# Patient Record
(demographics unavailable — no encounter records)

---

## 2024-11-19 NOTE — REASON FOR VISIT
[Hyperlipidemia] : hyperlipidemia [Hypertension] : hypertension [Other: ____] : [unfilled] [FreeTextEntry1] : 76 years old male with small cell cancer of the lungs, status post chemotherapy, hypertension SVT, diabetes mellitus, dyslipidemia, and renal failure comes to the office for routine visit.  Patient denies any chest pain no shortness of breath no palpitation

## 2024-11-19 NOTE — ASSESSMENT
[FreeTextEntry1] : EKG done revealed regular sinus rhythm right bundle branch block.  Patient will continue present medication no changes are made

## 2024-11-19 NOTE — PHYSICAL EXAM
[Well Developed] : well developed [Well Nourished] : well nourished [No Acute Distress] : no acute distress [Normal Conjunctiva] : normal conjunctiva [No Carotid Bruit] : no carotid bruit [Normal Venous Pressure] : normal venous pressure [No Murmur] : no murmur [Normal S1, S2] : normal S1, S2 [No Rub] : no rub [No Gallop] : no gallop [Clear Lung Fields] : clear lung fields [Good Air Entry] : good air entry [No Respiratory Distress] : no respiratory distress  [Soft] : abdomen soft [Non Tender] : non-tender [No Masses/organomegaly] : no masses/organomegaly [Normal Bowel Sounds] : normal bowel sounds [Normal Gait] : normal gait [No Edema] : no edema [No Cyanosis] : no cyanosis [No Clubbing] : no clubbing [No Varicosities] : no varicosities [No Rash] : no rash [No Skin Lesions] : no skin lesions [Moves all extremities] : moves all extremities [No Focal Deficits] : no focal deficits [Normal Speech] : normal speech [Alert and Oriented] : alert and oriented [Normal memory] : normal memory

## 2025-01-24 NOTE — DISCUSSION/SUMMARY
[FreeTextEntry1] : Patient's medications reviewed patient will continue present medication no changes are made

## 2025-01-24 NOTE — REASON FOR VISIT
[Arrhythmia/ECG Abnorrmalities] : arrhythmia/ECG abnormalities [Hyperlipidemia] : hyperlipidemia [Hypertension] : hypertension [FreeTextEntry1] : 77 years old male with supraventricular tachycardia, small cell cancer of the lungs at chemotherapy, diabetes mellitus, dyslipidemia renal insufficiency comes to the office for routine follow

## 2025-04-25 NOTE — REASON FOR VISIT
[Arrhythmia/ECG Abnorrmalities] : arrhythmia/ECG abnormalities [Hyperlipidemia] : hyperlipidemia [Hypertension] : hypertension [FreeTextEntry1] : 77 years old male with history of supraventricular tachycardia, small cell cancer of the lungs hypertension, dyslipidemia, mild renal insufficiency, comes to the office for routine follow-up

## 2025-04-25 NOTE — ASSESSMENT
[FreeTextEntry1] : Patient's medications reviewed patient will be seen in the office in about 3 months.

## 2025-07-15 NOTE — REASON FOR VISIT
[Arrhythmia/ECG Abnorrmalities] : arrhythmia/ECG abnormalities [Hypertension] : hypertension [Other: ____] : [unfilled] [FreeTextEntry1] : 77 years old male with history of supraventricular tachycardia, small cell cancer of the lung, hypertension comes to the office for routine follow-up